# Patient Record
Sex: FEMALE | Race: WHITE | NOT HISPANIC OR LATINO | Employment: OTHER
[De-identification: names, ages, dates, MRNs, and addresses within clinical notes are randomized per-mention and may not be internally consistent; named-entity substitution may affect disease eponyms.]

---

## 2023-06-19 ENCOUNTER — TRANSCRIBE ORDERS (OUTPATIENT)
Dept: OTHER | Age: 88
End: 2023-06-19

## 2023-06-19 DIAGNOSIS — H35.3232: Primary | ICD-10-CM

## 2023-06-19 DIAGNOSIS — Z96.1 PRESENCE OF INTRAOCULAR LENS: ICD-10-CM

## 2023-06-19 DIAGNOSIS — H52.4 PRESBYOPIA: ICD-10-CM

## 2023-06-19 DIAGNOSIS — H52.13 MYOPIA, BILATERAL: ICD-10-CM

## 2023-08-01 ENCOUNTER — THERAPY VISIT (OUTPATIENT)
Dept: OCCUPATIONAL THERAPY | Facility: CLINIC | Age: 88
End: 2023-08-01
Attending: OPHTHALMOLOGY
Payer: COMMERCIAL

## 2023-08-01 DIAGNOSIS — H52.13 MYOPIA, BILATERAL: ICD-10-CM

## 2023-08-01 DIAGNOSIS — H35.3232: ICD-10-CM

## 2023-08-01 DIAGNOSIS — Z96.1 PRESENCE OF INTRAOCULAR LENS: ICD-10-CM

## 2023-08-01 DIAGNOSIS — H52.4 PRESBYOPIA: ICD-10-CM

## 2023-08-01 PROCEDURE — 97535 SELF CARE MNGMENT TRAINING: CPT | Mod: GO | Performed by: OCCUPATIONAL THERAPIST

## 2023-08-01 PROCEDURE — 97165 OT EVAL LOW COMPLEX 30 MIN: CPT | Mod: GO | Performed by: OCCUPATIONAL THERAPIST

## 2023-08-01 NOTE — PROGRESS NOTES
OCCUPATIONAL THERAPY EVALUATION  Type of Visit: Evaluation and DISCHARGE SUMMARY (patient seen for 1 visit only - see goals/intervention provided below)    See electronic medical record for Abuse and Falls Screening details.    Subjective      Presenting condition or subjective complaint: vision  Date of onset: 06/19/23    Relevant medical history:   nothing provided in chart aside from eye dx  Dates & types of surgery:      Prior diagnostic imaging/testing results:       Prior therapy history for the same diagnosis, illness or injury: No      Prior Level of Function  Has needed some assistance for some time (physical and cognitive deficits as well as visual, though vision has decline more recently)    Living Environment  Social support: With family members (daughter and son-in-law)   Type of home: House   Stairs to enter the home:         Ramp:     Stairs inside the home:         Help at home: Home management tasks (cooking, cleaning); Home and Yard maintenance tasks; Assist for driving and community activities; Medication and/or finances  Equipment owned:       Employment:      Hobbies/Interests: read newspaper - hard because of vision, watch TV    Patient goals for therapy: read newspaper    Pain assessment: Pain denied     Objective   LOW VISION EVALUATION  ADDITIONAL HISTORY:  Current Responsibilities - IADLS: Laundry, Meal preparation, folds laundry, very simple meal prep - microwave    Others present at visit: son-in-law    COGNITIVE/BEHAVIORAL:  Communication:  a little [place], doesn't wear hearing aids  Cognitive Status:  short-term memory loss per son-in-law; not oriented to month or year; knows birthdate  apparent cognitive deficits  Behavior: Appropriate    Physical Status/Equipment   Physical Status: decreased balance - needing handhold assistance plus SEC  Mobility Equipment Used: Cane (single end), reports also has walkers that she uses at times  Bathing ADL Equipment Used: Shower chair/Tub  "bench  Toileting ADL Equipment Used:  none    VISUAL REPORT:  Functional complaints: Avocational tasks, Leisure, Reading, Writing  Visual Complaints: Constricted visual fields right eye, Constricted visual fields left eye, Difficulty maintaining focus  [place] Symptoms? No   Magnifiers and Low Vision AE owned: Handheld magnifier no light, Handheld magnifier illuminated  Reading Glasses: Bifocal (lined), has newer Rx but couldn't find them today when coming, so wearing old Rx  Power per MD report:  unknown  Technology:  none    LIGHTING AND GLARE:  Is your lighting adequate? Yes at home  Is glare a problem? Yes outdoors  Are you satisfied with your sunglasses? Yes   Sunglass Details: Transition glasses    VISUAL ACUITY:  Distance Acuity Right Eye:  not provided  Distance Acuity Left Eye:   not provided  Distance Acuity Both Eyes Together:   not provided  Near Visual Acuity:  see below    CONTRAST SENSITIVITY:  Contrast Sensitivity (score/25): 16/25    PREFERRED RETINAL LOCUS:  See below    MN Read  Smallest Print Size Read: ambient light: 4M (-3), task light: 3.2M (-6); at any distance (~9-10\"): parts of 2.5M   Critical Print Size: 6.3M  Words per minute at critical print size: 67wpm    Visual Field:  Central Visual Field:  difficult to assess due to patient not able to fully follow directions - she stated she could see all but moving eyes around  Central Visual Field Screen Used: Clock Face  Peripheral Visual Field:  see eye MD notes  Peripheral Visual Field Screen Used:  N/A    SRAFVP Scores:  Relevant Measures: 23  Composite Score: 37  Percentage of Disability: 19.57    Assessment & Plan   CLINICAL IMPRESSIONS  Medical Diagnosis: Exudative age-related macular degeneration, bilateral, with inactive choroidal neovascularization (H) (H35.3232)  - Primary, Presence of intraocular lens (Z96.1), Myopia, bilateral (H52.13), Presbyopia (H52.4)    Treatment Diagnosis: decreased ADL/IADL independence  " "  Impression/Assessment: Pt is a 89 year old female presenting to Occupational Therapy due to low vision (area addressed today per orders).  The following significant findings have been identified: Impaired mobility and Impaired visual perception.  These identified deficits interfere with their ability to perform self care tasks, recreational activities, household chores, medication management, and meal planning and preparation as compared to previous level of function.     Clinical Decision Making (Complexity):  Assessment of Occupational Performance: 5 or more Performance Deficits  Occupational Performance Limitations: feeding, health management and maintenance, meal preparation and cleanup, leisure activities - all in relation to vision and tasks she normally completes  Clinical Decision Making (Complexity): Low complexity    PLAN OF CARE  Treatment Interventions:  Interventions: Self-Care/Home Management, Therapeutic Activity    Long Term Goals   OT Goal 1  Goal Identifier: Near Vision  Goal Description: Patient/family will demonstrate 3 pieces of adaptive equipment and/or adaptive techniques in regards to magnification, lighting, contrast and/or glare for increased ADL/IADL independence with near vision tasks (reading, writing).  Rationale: In order to maximize safety and independence with performance of self-care activities;In order to safely and appropriately apply compensatory strategies with ADL/IADL performance  Goal Progress: Goal met.  Education provided in: continuous reading: A [employer]osed [employer] Television (CCTV) is the best magnifier for her - preferred black on white today, trialed 8\" and 12.5\" screen, however, recommend screen of 15\" or more; 3 and 4x stand magnifiers not very successful; spot reading/handheld magnification:5x handheld magnifier with light appeared best power; educated when to use handheld vs CCTV; Writing AE and adapted strategies:   bold lined paper successful with good light and Low " vision pen/marker worked well; print vs. cursive and print big  Target Date: 08/31/23  Date Met: 08/01/23  OT Goal 2  Goal Identifier: Environmental modifications  Goal Description: Patient/family will demonstrate and/or verbalize 2 environmentally-based ADL modifications to improve visual-based ADL/IADL activities.  Rationale: In order to maximize safety and independence with performance of self-care activities;In order to safely and appropriately apply compensatory strategies with ADL/IADL performance  Goal Progress: Goal met.  Education provided in: Lighting: Recommend an LED bulb with color temperature of 5000K and 75 or 100watt equivalent to see the headlines of the newspaper until get CCTV, etc.; Tactile (sensory substitution): Orange Bump dots would be helpful for microwave buttons  Target Date: 08/31/23  Date Met: 08/01/23  OT Goal 3  Goal Identifier: Resource Education  Goal Description: Patient will verbalize awareness of 2 community resources for increased ADL/IADL completion.  Rationale: In order to maximize safety and independence with performance of self-care activities  Goal Progress: Goal met.  Education provided in: [employer]; [employer] and resources they offer  Target Date: 08/31/23  Date Met: 08/01/23      Frequency of Treatment: 1x/week, decreasing frequency as indicated  Duration of Treatment: 6 months (extended due to potential scheduling conflicts)     Recommended Referrals to Other Professionals: Physical Therapy  Education Assessment: Learner/Method: Patient;Family;Listening;Reading;Demonstration  Education Comments: see above     Risks and benefits of evaluation/treatment have been explained.   Patient/Family/caregiver agrees with Plan of Care.     Evaluation Time:    OT Eval, Low Complexity Minutes (47151): 20  Signing Clinician: Diana Wallace OT      [facility]                                                                                    OUTPATIENT OCCUPATIONAL THERAPY      PLAN OF TREATMENT FOR OUTPATIENT REHABILITATION   Patient's Last Name, First Name, Ailin Schaefer YOB: 1931   Provider's Name   [facility]   Medical Record No.  [id number]     Onset Date: 06/19/23 Start of Care Date: 08/01/23     Medical Diagnosis:  Exudative age-related macular degeneration, bilateral, with inactive choroidal neovascularization (H) (H35.3232)  - Primary, Presence of intraocular lens (Z96.1), Myopia, bilateral (H52.13), Presbyopia (H52.4)      OT Treatment Diagnosis:  decreased ADL/IADL independence Plan of Treatment  Frequency/Duration:1x/week, decreasing frequency as indicated/6 months (extended due to potential scheduling conflicts)    Certification date from 08/01/23   To 08/31/23        See note for plan of treatment details and functional goals     Diana Wallace OT                         I CERTIFY THE NEED FOR THESE SERVICES FURNISHED UNDER        THIS PLAN OF TREATMENT AND WHILE UNDER MY CARE .             Physician Signature               Date    X_____________________________________________________                    Referring Provider:  Allen Younger      Initial Assessment  See Epic Evaluation- 08/01/23